# Patient Record
Sex: FEMALE | Race: BLACK OR AFRICAN AMERICAN | Employment: PART TIME
[De-identification: names, ages, dates, MRNs, and addresses within clinical notes are randomized per-mention and may not be internally consistent; named-entity substitution may affect disease eponyms.]

---

## 2024-02-14 PROBLEM — E66.9 SUPER OBESITY: Status: ACTIVE | Noted: 2024-02-14

## 2024-02-14 PROBLEM — K76.0 HEPATIC STEATOSIS: Status: ACTIVE | Noted: 2024-02-14

## 2024-07-05 ENCOUNTER — HOSPITAL ENCOUNTER (EMERGENCY)
Facility: HOSPITAL | Age: 24
Discharge: HOME | End: 2024-07-05
Attending: EMERGENCY MEDICINE
Payer: MEDICAID

## 2024-07-05 ENCOUNTER — APPOINTMENT (OUTPATIENT)
Facility: HOSPITAL | Age: 24
End: 2024-07-05
Payer: MEDICAID

## 2024-07-05 VITALS
RESPIRATION RATE: 18 BRPM | TEMPERATURE: 98.8 F | BODY MASS INDEX: 43.39 KG/M2 | OXYGEN SATURATION: 100 % | HEIGHT: 66 IN | HEART RATE: 75 BPM | SYSTOLIC BLOOD PRESSURE: 135 MMHG | WEIGHT: 270 LBS | DIASTOLIC BLOOD PRESSURE: 59 MMHG

## 2024-07-05 DIAGNOSIS — M17.11 ARTHRITIS OF RIGHT KNEE: Primary | ICD-10-CM

## 2024-07-05 PROCEDURE — 6370000000 HC RX 637 (ALT 250 FOR IP): Performed by: EMERGENCY MEDICINE

## 2024-07-05 PROCEDURE — 73562 X-RAY EXAM OF KNEE 3: CPT

## 2024-07-05 PROCEDURE — 99283 EMERGENCY DEPT VISIT LOW MDM: CPT

## 2024-07-05 RX ORDER — ACETAMINOPHEN 325 MG/1
650 TABLET ORAL
Status: COMPLETED | OUTPATIENT
Start: 2024-07-05 | End: 2024-07-05

## 2024-07-05 RX ORDER — ERGOCALCIFEROL 1.25 MG/1
50000 CAPSULE ORAL WEEKLY
COMMUNITY
Start: 2024-06-19

## 2024-07-05 RX ORDER — BUSPIRONE HYDROCHLORIDE 5 MG/1
TABLET ORAL
COMMUNITY

## 2024-07-05 RX ORDER — CICLOPIROX 1 G/100ML
SHAMPOO TOPICAL
COMMUNITY

## 2024-07-05 RX ORDER — IBUPROFEN 800 MG/1
800 TABLET ORAL 2 TIMES DAILY PRN
Qty: 30 TABLET | Refills: 1 | Status: SHIPPED | OUTPATIENT
Start: 2024-07-05 | End: 2024-07-05

## 2024-07-05 RX ORDER — IBUPROFEN 400 MG/1
800 TABLET ORAL
Status: DISCONTINUED | OUTPATIENT
Start: 2024-07-05 | End: 2024-07-05 | Stop reason: HOSPADM

## 2024-07-05 RX ORDER — VITAMIN A ACETATE 95 %
CRYSTALS MISCELLANEOUS
COMMUNITY

## 2024-07-05 RX ADMIN — ACETAMINOPHEN 325MG 650 MG: 325 TABLET ORAL at 20:26

## 2024-07-05 ASSESSMENT — PAIN DESCRIPTION - LOCATION
LOCATION: KNEE
LOCATION: KNEE

## 2024-07-05 ASSESSMENT — PAIN DESCRIPTION - ORIENTATION
ORIENTATION: RIGHT
ORIENTATION: RIGHT

## 2024-07-05 ASSESSMENT — PAIN DESCRIPTION - DESCRIPTORS: DESCRIPTORS: ACHING

## 2024-07-05 ASSESSMENT — PAIN - FUNCTIONAL ASSESSMENT: PAIN_FUNCTIONAL_ASSESSMENT: 0-10

## 2024-07-05 ASSESSMENT — PAIN SCALES - GENERAL
PAINLEVEL_OUTOF10: 2
PAINLEVEL_OUTOF10: 2

## 2024-07-05 ASSESSMENT — LIFESTYLE VARIABLES
HOW OFTEN DO YOU HAVE A DRINK CONTAINING ALCOHOL: NEVER
HOW MANY STANDARD DRINKS CONTAINING ALCOHOL DO YOU HAVE ON A TYPICAL DAY: PATIENT DOES NOT DRINK

## 2024-07-05 NOTE — ED TRIAGE NOTES
Pt ambulatory to triage with slight limp, A&O x 4 and NAD. Pt c/o right knee pain that began one week ago. Denies any trauma. States pain worsened yesterday after driving for four hours. No redness, swelling or bruising noted to area.    Past Medical History:   Diagnosis Date    Allergic rhinitis     Asthma     Bipolar 1 disorder (HCC)     Depression     Eczema     Elevated blood pressure reading     Headache     Osgood-Schlatter's disease     PCOS (polycystic ovarian syndrome)     Pre-diabetes     Prediabetes

## 2024-07-05 NOTE — ED TRIAGE NOTES
RINSING    CITALOPRAM (CELEXA) 10 MG TABLET    Take 1 tablet by mouth daily    CLOBETASOL (TEMOVATE) 0.05 % CREAM    Apply topically 2 times daily    DIPHENHYDRAMINE (BENADRYL ALLERGY) 25 MG CAPSULE    Take 1-2 capsules by mouth every 6 hours as needed    ETONOGESTREL (NEXPLANON) 68 MG IMPLANT    Inject 1 implant by subcutaneous route.    FLUOCINOLONE ACETONIDE (SYNALAR) 0.01 % EXTERNAL SOLUTION        GABAPENTIN (NEURONTIN) 100 MG CAPSULE    Take 1 capsule by mouth every 8 (eight) hours for 5 days. Open capsule an take contents Max Daily Amount: 300 mg    IBUPROFEN (CHILDRENS ADVIL) 100 MG/5ML SUSPENSION    Take 30 mLs by mouth every 6 hours as needed for Fever    LAMOTRIGINE (LAMICTAL) 100 MG TABLET    Take 1 tablet by mouth every morning    LAMOTRIGINE (LAMICTAL) 25 MG TABLET    Take 1 tablet by mouth at bedtime    MONTELUKAST (SINGULAIR) 10 MG TABLET    Take 1 tablet by mouth nightly    OMEPRAZOLE (PRILOSEC) 40 MG DELAYED RELEASE CAPSULE    Take 1 capsule by mouth every morning (before breakfast) Open capsule and take contents. Take daily for 60 days following surgery.    ONDANSETRON (ZOFRAN-ODT) 8 MG TBDP DISINTEGRATING TABLET    Place 1 tablet under the tongue every 8 hours as needed for Nausea or Vomiting    TRAZODONE (DESYREL) 100 MG TABLET    Take 1 tablet by mouth nightly    URSODIOL (ACTIGALL) 250 MG TABLET    Take 1 tablet by mouth in the morning and at bedtime Begin taking 2 weeks after surgery. Take twice daily for 6 months to prevent Gallstone formation.    VITAMIN A ACETATE DYLAN    Vitamin A    VITAMIN D (ERGOCALCIFEROL) 1.25 MG (65654 UT) CAPS CAPSULE    Take 1 capsule by mouth Once a week at 5 PM       ALLERGIES     Dog epithelium (canis lupus familiaris), Dust mite extract, Gramineae pollens, Grass pollen([id number]-o-[id number]-t-swt donte), Molds & smuts, and Nickel    FAMILY HISTORY     History reviewed. No pertinent family history.       SOCIAL HISTORY       Social History     Socioeconomic History    Marital  status: Single     Spouse name: None    Number of children: None    Years of education: None    Highest education level: None   Tobacco Use    Smoking status: Never    Smokeless tobacco: Never   Vaping Use    Vaping Use: Never used   Substance and Sexual Activity    Alcohol use: Not Currently     Alcohol/week: 3.0 - 5.0 standard drinks of alcohol     Types: 3 - 5 Shots of liquor per week     Comment: Drinks on the weekends    Drug use: Not Currently    Sexual activity: Defer     Social Determinants of Health     Food Insecurity: No Food Insecurity (2/14/2024)    Hunger Vital Sign     Worried About Running Out of Food in the Last Year: Never true     Ran Out of Food in the Last Year: Never true   Transportation Needs: No Transportation Needs (2/14/2024)    PRAPARE - Transportation     Lack of Transportation (Medical): No     Lack of Transportation (Non-Medical): No   Housing Stability: Low Risk  (2/14/2024)    Housing Stability Vital Sign     Unable to Pay for Housing in the Last Year: No     Number of Places Lived in the Last Year: 1     Unstable Housing in the Last Year: No         PHYSICAL EXAM       ED Triage Vitals [07/05/24 1909]   BP Temp Temp Source Pulse Respirations SpO2 Height Weight - Scale   (!) 135/59 98.8 °F (37.1 °C) Oral 75 18 100 % 1.676 m (5' 6\") 122.5 kg (270 lb)       Physical Exam  Constitutional:       Appearance: Normal appearance.   Eyes:      Pupils: Pupils are equal, round, and reactive to light.   Cardiovascular:      Rate and Rhythm: Normal rate and regular rhythm.   Pulmonary:      Effort: Pulmonary effort is normal.      Breath sounds: Normal breath sounds.   Musculoskeletal:      Comments: RIGHT KNEE: no edema, (+) point tenderness over anterior infer lateral area beneath patellar with normal ROM, pulses and sensory.   Neurological:      Mental Status: She is alert.       No results found for this or any previous visit (from the past 24 hour(s)).    PROCEDURES:    Unless otherwise

## 2024-07-07 NOTE — ED TRIAGE NOTES
`[facility]  eMERGENCY dEPARTMENT eNCOUnter      Pt Name: Savanna James  MRN: [id number]  Birthdate 2000 of evaluation: 7/5/2024  Provider:Casey Mike MD    CHIEF COMPLAINT         HPI  Savanna James is a 24 y.o. female  present o triage with slight limp, A&O x 4 and NAD. Pt states her  right knee pain began one week ago. Denies hx of having trauma to her right knee. She states her right knee pain worsened yesterday after driving for 4 hours. No redness, swelling or bruising noted to her knee area.   She states has hx of right knee.    ROS  Review of Systems   Constitutional: Negative.    Respiratory: Negative.     Cardiovascular: Negative.    Musculoskeletal:         Right knee pain   All other systems reviewed and are negative.      Except as noted above the remainder of the review of systems was reviewed and negative.       PAST MEDICAL HISTORY     Past Medical History:   Diagnosis Date    Allergic rhinitis     Asthma     Bipolar 1 disorder (HCC)     Depression     Eczema     Elevated blood pressure reading     Headache     Osgood-Schlatter's disease     PCOS (polycystic ovarian syndrome)     Pre-diabetes     Prediabetes          SURGICAL HISTORY       Past Surgical History:   Procedure Laterality Date    SLEEVE GASTRECTOMY N/A 2/14/2024    LAPAROSCOPIC SLEEVE GASTRECTOMY;  LAPAROSCOPIC LIVER WEDGE BIOPSY performed by Ahsan Nguyen MD at [facility] MAIN OR    WISDOM TOOTH EXTRACTION           CURRENTMEDICATIONS       Discharge Medication List as of 7/5/2024  8:32 PM        CONTINUE these medications which have NOT CHANGED    Details   vitamin D (ERGOCALCIFEROL) 1.25 MG (92703 UT) CAPS capsule Take 1 capsule by mouth Once a week at 5 PMHistorical Med      busPIRone (BUSPAR) 5 MG tablet busPIRone HClHistorical Med      Vitamin A Acetate DYLAN Vitamin AHistorical Med      Ciclopirox 1 % SHAM USE AS SHAMPOO ONCE DAILY DURING FLARES AND TWICE WEEKLY FOR MAINTENANCE. ALLOW 3-5 MINUTES OF LATHERING

## 2024-07-12 ASSESSMENT — ENCOUNTER SYMPTOMS: RESPIRATORY NEGATIVE: 1
